# Patient Record
Sex: MALE | Race: BLACK OR AFRICAN AMERICAN | NOT HISPANIC OR LATINO | Employment: UNEMPLOYED | URBAN - METROPOLITAN AREA
[De-identification: names, ages, dates, MRNs, and addresses within clinical notes are randomized per-mention and may not be internally consistent; named-entity substitution may affect disease eponyms.]

---

## 2019-03-17 ENCOUNTER — HOSPITAL ENCOUNTER (EMERGENCY)
Facility: OTHER | Age: 39
Discharge: HOME | End: 2019-03-17
Attending: EMERGENCY MEDICINE

## 2019-03-17 VITALS
WEIGHT: 150 LBS | TEMPERATURE: 98 F | SYSTOLIC BLOOD PRESSURE: 140 MMHG | OXYGEN SATURATION: 99 % | RESPIRATION RATE: 18 BRPM | HEART RATE: 85 BPM | DIASTOLIC BLOOD PRESSURE: 82 MMHG | HEIGHT: 72 IN | BODY MASS INDEX: 20.32 KG/M2

## 2019-03-17 DIAGNOSIS — K02.9 DENTAL CARIES: ICD-10-CM

## 2019-03-17 DIAGNOSIS — H66.91 RIGHT OTITIS MEDIA, UNSPECIFIED OTITIS MEDIA TYPE: Primary | ICD-10-CM

## 2019-03-17 PROCEDURE — 99283 EMERGENCY DEPT VISIT LOW MDM: CPT

## 2019-03-17 PROCEDURE — 25000003 PHARM REV CODE 250: Performed by: EMERGENCY MEDICINE

## 2019-03-17 RX ORDER — AMOXICILLIN 250 MG/1
500 CAPSULE ORAL
Status: COMPLETED | OUTPATIENT
Start: 2019-03-17 | End: 2019-03-17

## 2019-03-17 RX ORDER — AMOXICILLIN 875 MG/1
875 TABLET, FILM COATED ORAL 2 TIMES DAILY
Qty: 14 TABLET | Refills: 0 | Status: SHIPPED | OUTPATIENT
Start: 2019-03-17

## 2019-03-17 RX ORDER — LIDOCAINE HYDROCHLORIDE 20 MG/ML
5 SOLUTION OROPHARYNGEAL
Status: COMPLETED | OUTPATIENT
Start: 2019-03-17 | End: 2019-03-17

## 2019-03-17 RX ADMIN — AMOXICILLIN 500 MG: 250 CAPSULE ORAL at 08:03

## 2019-03-17 RX ADMIN — LIDOCAINE HYDROCHLORIDE 5 ML: 20 SOLUTION ORAL; TOPICAL at 08:03

## 2019-03-17 NOTE — ED PROVIDER NOTES
"Encounter Date: 3/17/2019    SCRIBE #1 NOTE: I, Temitope Hobbs, am scribing for, and in the presence of, Dr. Beckett.       History     Chief Complaint   Patient presents with    Facial Swelling     Pt c/o right sided facial swelling, jaw pain & ear ache which started 1 week ago & progressively worsening. Pt states "It feels like its my teeth."     Time seen by provider: 7:35 AM    This is a 38 y.o. male who presents to the ED with complaint of right-sided facial swelling due to right ear and jaw pain that began approximately three days ago. Patient states it hurts more in the jaw with the pain radiating to his ear. Patient states the pain is so severe that has been preventing him from sleeping. Patient states he has been able to eat. Patient has a grill on his upper teeth which he had since he was 11 years old. Patient denies fever, chills, or trouble swallowing. Patient has no known allergies.      The history is provided by the patient.     Review of patient's allergies indicates:  No Known Allergies  History reviewed. No pertinent past medical history.  History reviewed. No pertinent surgical history.  History reviewed. No pertinent family history.  Social History     Tobacco Use    Smoking status: Current Every Day Smoker     Types: Cigarettes    Smokeless tobacco: Never Used   Substance Use Topics    Alcohol use: Yes     Frequency: Never     Comment: Socially    Drug use: Yes     Types: Marijuana     Review of Systems   Constitutional: Negative for chills and fever.   HENT: Positive for ear pain and facial swelling. Negative for trouble swallowing.    Respiratory: Negative for shortness of breath.    Cardiovascular: Negative for chest pain.   Gastrointestinal: Negative for nausea.   Genitourinary: Negative for dysuria.   Musculoskeletal: Positive for arthralgias (right jaw). Negative for back pain.   Skin: Negative for rash.   Neurological: Negative for weakness.   Hematological: Does not bruise/bleed " easily.       Physical Exam     Initial Vitals [03/17/19 0721]   BP Pulse Resp Temp SpO2   (!) 140/82 85 18 98.1 °F (36.7 °C) 99 %      MAP       --         Physical Exam    Nursing note and vitals reviewed.  Constitutional: He appears well-developed and well-nourished. He is not diaphoretic. No distress.   HENT:   Head: Normocephalic and atraumatic.   [place] in place in upper teeth. Multiple carries. No tooth tenderness to palpation or abscess noted. No facial swelling. Right TM erythematous and bulging with erythema to the canal. Uvula midline.    Eyes: Conjunctivae and EOM are normal. Pupils are equal, round, and reactive to light. No scleral icterus.   Neck: Normal range of motion. Neck supple.   Cardiovascular: Normal rate, regular rhythm and normal heart sounds. Exam reveals no gallop and no friction rub.    No murmur heard.  Pulmonary/Chest: Breath sounds normal. No stridor. No respiratory distress. He has no wheezes. He has no rhonchi. He has no rales.   Airways patent.   Abdominal: Soft. Bowel sounds are normal. He exhibits no distension. There is no tenderness. There is no rebound and no guarding.   Musculoskeletal: Normal range of motion. He exhibits no edema or tenderness.   Lymphadenopathy:     He has no cervical adenopathy.   Neurological: He is alert and oriented to person, place, and time.   Skin: Skin is warm and dry. No rash noted. No erythema. No pallor.   Psychiatric: He has a normal mood and affect. His behavior is normal. Judgment and thought content normal.         ED Course   Procedures  Labs Reviewed - No data to display       Imaging Results    None          Medical Decision Making:   Differential Diagnosis:   Otitis media, dental infection, dental caries  ED Management:  This is an emergent evaluation of a 38 year  male presenting with complaint of right ear and jaw pain. On physical exam he is nontoxic, afebrile, hypertensive, with bulging and erythema to the right tympanic  membrane.  Noted patient has poor dentition with multiple caries and gold drill in place however no tooth tenderness to palpation, no abscess is noted, and no bleeding of the gums.  Patient has been prescribed amoxicillin for otitis media and instructed to follow up with dentist and primary care.            Scribe Attestation:   Scribe #1: I performed the above scribed service and the documentation accurately describes the services I performed. I attest to the accuracy of the note.    Attending Attestation:           Physician Attestation for Scribe:  Physician Attestation Statement for Scribe #1: I, Dr. Beckett, reviewed documentation, as scribed by Temitope Hobbs in my presence, and it is both accurate and complete.                    Clinical Impression:     1. Right otitis media, unspecified otitis media type    2. Dental caries                                   Shahida Beckett MD  03/18/19 3586

## 2019-03-17 NOTE — ED TRIAGE NOTES
"Pt presents with c/o right sided facial pain and tooth pain radiating from the ear onset 1 week ago. Pt took ibuprofen this AM. Pt reports he does not have a dentist "I haven't seen one in years." Pt right ear red and inflamed on exam. Pt AAOx4, RR even and unlabored, NAD noted. Bed locked and in lowest position with call light within reach.  "

## 2019-08-12 ENCOUNTER — HOSPITAL ENCOUNTER (EMERGENCY)
Facility: HOSPITAL | Age: 39
Discharge: HOME | End: 2019-08-12
Attending: EMERGENCY MEDICINE

## 2019-08-12 VITALS
HEART RATE: 70 BPM | TEMPERATURE: 100 F | RESPIRATION RATE: 16 BRPM | OXYGEN SATURATION: 100 % | DIASTOLIC BLOOD PRESSURE: 105 MMHG | HEIGHT: 72 IN | SYSTOLIC BLOOD PRESSURE: 178 MMHG | WEIGHT: 160 LBS | BODY MASS INDEX: 21.67 KG/M2

## 2019-08-12 DIAGNOSIS — S61.235A: Primary | ICD-10-CM

## 2019-08-12 PROCEDURE — 90471 IMMUNIZATION ADMIN: CPT | Performed by: EMERGENCY MEDICINE

## 2019-08-12 PROCEDURE — 25000003 PHARM REV CODE 250: Performed by: EMERGENCY MEDICINE

## 2019-08-12 PROCEDURE — 96372 THER/PROPH/DIAG INJ SC/IM: CPT

## 2019-08-12 PROCEDURE — 99284 PR EMERGENCY DEPT VISIT,LEVEL IV: ICD-10-PCS | Mod: ,,, | Performed by: EMERGENCY MEDICINE

## 2019-08-12 PROCEDURE — 99284 EMERGENCY DEPT VISIT MOD MDM: CPT | Mod: 25

## 2019-08-12 PROCEDURE — 64450 NJX AA&/STRD OTHER PN/BRANCH: CPT

## 2019-08-12 PROCEDURE — 90715 TDAP VACCINE 7 YRS/> IM: CPT | Performed by: EMERGENCY MEDICINE

## 2019-08-12 PROCEDURE — 63600175 PHARM REV CODE 636 W HCPCS: Performed by: EMERGENCY MEDICINE

## 2019-08-12 PROCEDURE — 99284 EMERGENCY DEPT VISIT MOD MDM: CPT | Mod: ,,, | Performed by: EMERGENCY MEDICINE

## 2019-08-12 RX ORDER — AMPICILLIN AND SULBACTAM 1; .5 G/1; G/1
1.5 INJECTION, POWDER, FOR SOLUTION INTRAMUSCULAR; INTRAVENOUS
Status: COMPLETED | OUTPATIENT
Start: 2019-08-12 | End: 2019-08-12

## 2019-08-12 RX ORDER — BUPIVACAINE HYDROCHLORIDE 2.5 MG/ML
5 INJECTION, SOLUTION EPIDURAL; INFILTRATION; INTRACAUDAL
Status: COMPLETED | OUTPATIENT
Start: 2019-08-12 | End: 2019-08-12

## 2019-08-12 RX ORDER — AMOXICILLIN AND CLAVULANATE POTASSIUM 875; 125 MG/1; MG/1
1 TABLET, FILM COATED ORAL EVERY 12 HOURS
Qty: 14 TABLET | Refills: 0 | Status: SHIPPED | OUTPATIENT
Start: 2019-08-12 | End: 2019-08-19

## 2019-08-12 RX ORDER — AMPICILLIN AND SULBACTAM 2; 1 G/1; G/1
3 INJECTION, POWDER, FOR SOLUTION INTRAMUSCULAR; INTRAVENOUS
Status: DISCONTINUED | OUTPATIENT
Start: 2019-08-12 | End: 2019-08-12

## 2019-08-12 RX ORDER — HYDROCODONE BITARTRATE AND ACETAMINOPHEN 5; 325 MG/1; MG/1
1 TABLET ORAL EVERY 6 HOURS PRN
Qty: 13 TABLET | Refills: 0 | Status: SHIPPED | OUTPATIENT
Start: 2019-08-12

## 2019-08-12 RX ORDER — HYDROCODONE BITARTRATE AND ACETAMINOPHEN 5; 325 MG/1; MG/1
2 TABLET ORAL
Status: COMPLETED | OUTPATIENT
Start: 2019-08-12 | End: 2019-08-12

## 2019-08-12 RX ADMIN — BUPIVACAINE HYDROCHLORIDE 12.5 MG: 2.5 INJECTION, SOLUTION EPIDURAL; INFILTRATION; INTRACAUDAL at 09:08

## 2019-08-12 RX ADMIN — CLOSTRIDIUM TETANI TOXOID ANTIGEN (FORMALDEHYDE INACTIVATED), CORYNEBACTERIUM DIPHTHERIAE TOXOID ANTIGEN (FORMALDEHYDE INACTIVATED), BORDETELLA PERTUSSIS TOXOID ANTIGEN (GLUTARALDEHYDE INACTIVATED), BORDETELLA PERTUSSIS FILAMENTOUS HEMAGGLUTININ ANTIGEN (FORMALDEHYDE INACTIVATED), BORDETELLA PERTUSSIS PERTACTIN ANTIGEN, AND BORDETELLA PERTUSSIS FIMBRIAE 2/3 ANTIGEN 0.5 ML: 5; 2; 2.5; 5; 3; 5 INJECTION, SUSPENSION INTRAMUSCULAR at 08:08

## 2019-08-12 RX ADMIN — AMPICILLIN AND SULBACTAM 1.5 G: 1; .5 INJECTION, POWDER, FOR SOLUTION INTRAMUSCULAR; INTRAVENOUS at 08:08

## 2019-08-12 RX ADMIN — HYDROCODONE BITARTRATE AND ACETAMINOPHEN 2 TABLET: 5; 325 TABLET ORAL at 06:08

## 2019-08-12 NOTE — ED TRIAGE NOTES
"Pt states he injured the left ring finger while playing football yesterday. States the person who hit hims "teeth went into my finger". Bandage noted to finger.   "

## 2019-08-12 NOTE — LETTER
Fax Transmission                                                                                                                                                       Date: August 12, 2019       To:   From:    Fax:   Fax:    Phone:   Phone:      Special Instructions:     ***For questions or issues, please contact department listed on attached report.***                            If there are any problems with this transmission, please call immediately. Thank you.    Confidentiality notice: The accompanying facsimile is intended solely for the use of the recipient designated above. Document(s) transmitted herewith may contain information that is confidential and privileged. Delivery, distribution or dissemination of this communication other than to the intended recipient is strictly prohibited. If you have received this facsimile in error, please notify [facility]'s Corporate Integrity Department immediately by telephone at [phone].

## 2019-08-12 NOTE — ED PROVIDER NOTES
Encounter Date: 8/12/2019    SCRIBE #1 NOTE: I, Ivonne Mcdonald, am scribing for, and in the presence of,  Lm Jimenez III, MD. I have scribed the entire note.       History     Chief Complaint   Patient presents with    Finger Injury     punched someone in mouth yest, wound to L ring finger     Time patient was seen by the provider: 6:14 PM      The patient is a 38 y.o. male with no PMHx,  who presents to the ED with a complaint of left hand pain and left ring finger wound s/p playing touch football yesterday. He reports he was playing football when his hand hit someone's mouth. Patient reports being unable to clench his hand secondary to the pain. Unknown last tetanus shot. Patient denies any other associated symptoms.       The history is provided by the patient.     Review of patient's allergies indicates:  No Known Allergies  History reviewed. No pertinent past medical history.  History reviewed. No pertinent surgical history.  History reviewed. No pertinent family history.  Social History     Tobacco Use    Smoking status: Current Every Day Smoker     Types: Cigarettes    Smokeless tobacco: Never Used   Substance Use Topics    Alcohol use: Yes     Frequency: Never     Comment: Socially    Drug use: Yes     Frequency: 7.0 times per week     Types: Marijuana     Review of Systems   Constitutional: Negative for fever.   HENT: Negative for sore throat.    Respiratory: Negative for shortness of breath.    Cardiovascular: Negative for chest pain.   Gastrointestinal: Negative for nausea.   Genitourinary: Negative for dysuria.   Musculoskeletal: Negative for back pain.        Left hand pain   Skin: Positive for wound (Left fourth digit). Negative for rash.   Neurological: Negative for weakness.   Hematological: Does not bruise/bleed easily.       Physical Exam     Initial Vitals [08/12/19 1558]   BP Pulse Resp Temp SpO2   (!) 178/99 89 18 99.2 °F (37.3 °C) 99 %      MAP       --         Physical  Exam    Nursing note and vitals reviewed.  Constitutional: He appears well-developed and well-nourished. He is not diaphoretic. He appears distressed.   Distressed secondary to pain.    HENT:   Head: Normocephalic and atraumatic.   Nose: Nose normal.   Eyes: Conjunctivae are normal. Right eye exhibits no discharge. Left eye exhibits no discharge.   Neck: Normal range of motion. Neck supple.   Cardiovascular: Normal rate, regular rhythm and normal heart sounds. Exam reveals no gallop and no friction rub.    No murmur heard.  Pulmonary/Chest: Breath sounds normal. No respiratory distress. He has no wheezes. He has no rhonchi. He has no rales.   Abdominal: Soft. He exhibits no distension. There is no tenderness. There is no rebound and no guarding.   Genitourinary: Penis normal.   Musculoskeletal: Normal range of motion. He exhibits no edema or tenderness.   Left ring finger swollen tender with severe restriction of range of motion at the PIP of the left ring finger where a puncture wound was noted. Capillary refill less than 2 seconds. Light sensation intact.    Neurological: He is alert and oriented to person, place, and time. He has normal strength. GCS score is 15. GCS eye subscore is 4. GCS verbal subscore is 5. GCS motor subscore is 6.   Skin: Skin is warm and dry. Capillary refill takes less than 2 seconds. No rash noted.   Refer back to MS   Psychiatric: He has a normal mood and affect. His behavior is normal. Judgment normal.         ED Course   Procedures  Labs Reviewed - No data to display       Imaging Results          X-Ray Finger 2 or More Views Left (Final result)  Result time 08/12/19 19:07:34    Final result by Jovanny Delacruz MD (08/12/19 19:07:34)                 Impression:      1. No convincing acute displaced fracture or dislocation of the visualized digits noting edema about the 3rd through 5th digits.      Electronically signed by: Jovanny Delacruz MD  Date:    08/12/2019  Time:    19:07              Narrative:    EXAMINATION:  XR FINGER 2 OR MORE VIEWS LEFT    CLINICAL HISTORY:  pain;    COMPARISON:  None    FINDINGS:  Three views, left 3rd 4th and 5th digits.    No acute displaced fracture or dislocation of the visualized digits.  No radiopaque foreign body.  There is edema about the digits.                                 Medical Decision Making:   History:   Old Medical Records: I decided to obtain old medical records.  Old Records Summarized: records from clinic visits.       <> Summary of Records: Records summarized in HPI   Initial Assessment:   38 y.o. male presents with acute severe pain to his left ring finger. Patient was playing touch football yesterday and accidentally struck the teeth of another player with the dorsal aspect of his left hand. He suffered a puncture wound to the PIP of the left ring finger. Today the pain is so severe that he can't bend his left index finger. It is swollen and cellulitic with some extension towards the MCP. However, there is no lumbar pull or deep space pain to the left hand. Patient's examination at this time is difficult to determine any tendon injury or fracture. Xray is pending. Will consult orthopedics and consider doing a digit block. Consider fracture and or tendon injury with possible septic joint and less likely deep space infection.   Clinical Tests:   Radiological Study: Ordered and Reviewed  Other:   I have discussed this case with another health care provider.       <> Summary of the Discussion: consult to Orthopedic Surgery             Scribe Attestation:   Scribe #1: I performed the above scribed service and the documentation accurately describes the services I performed. I attest to the accuracy of the note.               Clinical Impression:       ICD-10-CM ICD-9-CM   1. Puncture wound of left ring finger with complication S61.235A 883.1         Disposition:   Disposition: Discharged  Condition: Stable  Patient was evaluated by Ortho, and  after performing a digit block patient had full range of motion of his hand.  There does not appear to be any tendon injury. Ortho also does not believe that the puncture wound penetrated the joint or there is an infected joint.  It was cleaned out and he was given IM antibiotics here and will be discharged on p.o. Augmentin with follow-up with Ortho at [facility].                        Lm Jimenez III, MD  08/12/19 8089

## 2019-08-13 NOTE — CONSULTS
Orthopaedic Surgery  Consult Note    Leandro Peters  08/12/2019    CC: left ring finger laceration    HPI: Leandro Peters is a RHD 38 y.o. male with no significant PMHx who presents with a laceration over the dorsal aspect of the left ring finger at the level of the PIP joint. Patients states that he was playing backyard football with his nephew when his hand struck the face of his nephew.  He believes that the laceration was due to a cut by his nephew's tooth.  This injury occurred yesterday.  He has tried to keep the laceration clean, but has experienced increased swelling in the finger diffusely.  Denies head injury or LOC. Denies prior injuries or surgeries to the left upper extremity. On no blood thinners at home. Denies other MSK pains or paresthesias.  He has not had any antibiotics prior to arrival to ED.  He was given IV Unasyn x1 and T dap upon arrival to the ED.      History reviewed. No pertinent past medical history.  History reviewed. No pertinent surgical history.  History reviewed. No pertinent family history.  Social History     Socioeconomic History    Marital status: Single     Spouse name: Not on file    Number of children: Not on file    Years of education: Not on file    Highest education level: Not on file   Occupational History    Not on file   Social Needs    Financial resource strain: Not on file    Food insecurity:     Worry: Not on file     Inability: Not on file    Transportation needs:     Medical: Not on file     Non-medical: Not on file   Tobacco Use    Smoking status: Current Every Day Smoker     Types: Cigarettes    Smokeless tobacco: Never Used   Substance and Sexual Activity    Alcohol use: Yes     Frequency: Never     Comment: Socially    Drug use: Yes     Frequency: 7.0 times per week     Types: Marijuana    Sexual activity: Not on file   Lifestyle    Physical activity:     Days per week: Not on file     Minutes per session: Not on file    Stress: Not on file    Relationships    Social connections:     Talks on phone: Not on file     Gets together: Not on file     Attends Sabianism service: Not on file     Active member of club or organization: Not on file     Attends meetings of clubs or organizations: Not on file     Relationship status: Not on file   Other Topics Concern    Not on file   Social History Narrative    Not on file     No current facility-administered medications on file prior to encounter.      Current Outpatient Medications on File Prior to Encounter   Medication Sig    amoxicillin (AMOXIL) 875 MG tablet Take 1 tablet (875 mg total) by mouth 2 (two) times daily.         Review of Systems:  Constitutional: negative for fevers  Eyes: negative visual changes  ENT: negative for hearing loss  Respiratory: negative for dyspnea  Cardiovascular: negative for chest pain  Gastrointestinal: negative for abdominal pain  Genitourinary: negative for dysuria  Neurological: negative for headaches  Behavioral/Psych: negative for hallucinations  Endocrine: negative for temperature intolerance      Physical Exam:    Temp:  [99.2 °F (37.3 °C)-99.5 °F (37.5 °C)] 99.5 °F (37.5 °C)  Pulse:  [70-89] 70  Resp:  [16-18] 16  SpO2:  [99 %-100 %] 100 %  BP: (178-182)/() 178/105    Vitals: Afebrile.  Vital signs stable.  General: No acute distress.  HEENT: Normocephalic. Atraumatic. Sclera anicteric. No tracheal deviation.  Cardio: Regular rate.  Chest: No increased work of breathing.  Abdominal: Nondistended.  Extremities: No cyanosis.  No clubbing.    Skin: No generalized rash.  Neuro: Awake. Alert. Oriented to person, place, time, and situation.  Psych: Normal appearance. Cooperative.  Appropriate mood.  Appropriate affect.      MSK:  Left hand:  1 cm laceration over the dorsal aspect of the left ring finger at the level of the PIP joint  Diffuse swelling of the entire ring finger  SILT throughout  All extensor and flexor tendons intact  ROM at the PIP and DIP joints of  the ring finger limited due to swelling and pain  <2 sec cap refill    Diagnostic Results:  X-ray of the left hand show no fractures, dislocations, or bony defects    Procedure Note: L Ring Finger Laceration Repair  After time out was performed and patient ID, side, and site were verified, the left Ring finger was sterilly prepped in the standard fashion.  10 mL's in a 25-gauge needle was used for a digital block of the left ring finger. After adequate analgesia was obtained, the wound was examined. Examination showed no tendon or nerve injuries.  The laceration did not probe down to joint.  There were no signs of fracture. The laceration was then thoroughly irrigated with 2L of normal saline and betadine.  The wound was not primarily closed to allow for drainage. The wound was sterilely dressed with Xeroform, 4 x 4, and cast padding, then placed into an ulnar gutter splint in typical fashion.  The patient tolerated the procedure well with no complications.  Blood loss was minimal.      Assessment/Plan:  Leandro Peters is a 38 y.o. male with left ring finger laceration  - Bedside I&D performed and splinted in ED   - NWB to left upper extremity, pt encouraged to keep extremity iced and elevated at all times  - T dap and IV Unasyn x1 given in ED  - sent home with 7 days of PO Augmentin    This patient will need close orthopedic follow-up for this laceration.  The patient was presented several options for follow-up including care at [facility], but the patient did not desire further care here due to financial concerns.  Follow up was then offered at [facility], [facility], and [facility].  Patient has elected to seek follow up care at [facility].  Contact number was taken and the patient will be contacted for a follow up appointment.  Patient is being discharged with paperwork including all diagnostic workup while here at [facility] and instructed to bring to to their follow up appointment. Referral form was faxed to orthopedic clinic  at [facility] in the ED.  Patient was in full understanding and agreement with this plan.         Boo Batista MD  Orthopaedic Surgery Resident  08/12/2019

## 2020-11-19 ENCOUNTER — HOSPITAL ENCOUNTER (EMERGENCY)
Facility: HOSPITAL | Age: 40
Discharge: HOME | End: 2020-11-19
Attending: EMERGENCY MEDICINE
Payer: MEDICAID

## 2020-11-19 VITALS
SYSTOLIC BLOOD PRESSURE: 131 MMHG | DIASTOLIC BLOOD PRESSURE: 84 MMHG | RESPIRATION RATE: 18 BRPM | OXYGEN SATURATION: 96 % | HEIGHT: 72 IN | BODY MASS INDEX: 24.38 KG/M2 | TEMPERATURE: 98 F | WEIGHT: 180 LBS | HEART RATE: 81 BPM

## 2020-11-19 DIAGNOSIS — R55 NEAR SYNCOPE: ICD-10-CM

## 2020-11-19 DIAGNOSIS — S61.011A LACERATION OF RIGHT THUMB WITHOUT FOREIGN BODY WITHOUT DAMAGE TO NAIL, INITIAL ENCOUNTER: Primary | ICD-10-CM

## 2020-11-19 LAB — GLUCOSE SERPL-MCNC: 102 MG/DL (ref 70–110)

## 2020-11-19 PROCEDURE — 93010 EKG 12-LEAD: ICD-10-PCS | Mod: ,,, | Performed by: INTERNAL MEDICINE

## 2020-11-19 PROCEDURE — 93010 ELECTROCARDIOGRAM REPORT: CPT | Mod: ,,, | Performed by: INTERNAL MEDICINE

## 2020-11-19 PROCEDURE — 82962 GLUCOSE BLOOD TEST: CPT

## 2020-11-19 PROCEDURE — 12002 RPR S/N/AX/GEN/TRNK2.6-7.5CM: CPT | Mod: F5

## 2020-11-19 PROCEDURE — 93005 ELECTROCARDIOGRAM TRACING: CPT

## 2020-11-19 PROCEDURE — 12002 PR RESUP NPTERF WND BODY 2.6-7.5 CM: ICD-10-PCS | Mod: ,,, | Performed by: EMERGENCY MEDICINE

## 2020-11-19 PROCEDURE — 25000003 PHARM REV CODE 250: Performed by: INTERNAL MEDICINE

## 2020-11-19 PROCEDURE — 99285 PR EMERGENCY DEPT VISIT,LEVEL V: ICD-10-PCS | Mod: 25,,, | Performed by: EMERGENCY MEDICINE

## 2020-11-19 PROCEDURE — 12002 RPR S/N/AX/GEN/TRNK2.6-7.5CM: CPT | Mod: ,,, | Performed by: EMERGENCY MEDICINE

## 2020-11-19 PROCEDURE — 63600175 PHARM REV CODE 636 W HCPCS: Mod: SL | Performed by: INTERNAL MEDICINE

## 2020-11-19 PROCEDURE — 99284 EMERGENCY DEPT VISIT MOD MDM: CPT | Mod: 25

## 2020-11-19 PROCEDURE — 99285 EMERGENCY DEPT VISIT HI MDM: CPT | Mod: 25,,, | Performed by: EMERGENCY MEDICINE

## 2020-11-19 PROCEDURE — 90715 TDAP VACCINE 7 YRS/> IM: CPT | Mod: SL | Performed by: INTERNAL MEDICINE

## 2020-11-19 PROCEDURE — 90471 IMMUNIZATION ADMIN: CPT | Mod: VFC | Performed by: INTERNAL MEDICINE

## 2020-11-19 RX ORDER — LIDOCAINE HYDROCHLORIDE 10 MG/ML
10 INJECTION INFILTRATION; PERINEURAL
Status: COMPLETED | OUTPATIENT
Start: 2020-11-19 | End: 2020-11-19

## 2020-11-19 RX ADMIN — BACITRACIN, NEOMYCIN, POLYMYXIN B 1 EACH: 400; 3.5; 5 OINTMENT TOPICAL at 10:11

## 2020-11-19 RX ADMIN — LIDOCAINE HYDROCHLORIDE 10 ML: 10 INJECTION, SOLUTION INFILTRATION; PERINEURAL at 09:11

## 2020-11-19 RX ADMIN — CLOSTRIDIUM TETANI TOXOID ANTIGEN (FORMALDEHYDE INACTIVATED), CORYNEBACTERIUM DIPHTHERIAE TOXOID ANTIGEN (FORMALDEHYDE INACTIVATED), BORDETELLA PERTUSSIS TOXOID ANTIGEN (GLUTARALDEHYDE INACTIVATED), BORDETELLA PERTUSSIS FILAMENTOUS HEMAGGLUTININ ANTIGEN (FORMALDEHYDE INACTIVATED), BORDETELLA PERTUSSIS PERTACTIN ANTIGEN, AND BORDETELLA PERTUSSIS FIMBRIAE 2/3 ANTIGEN 0.5 ML: 5; 2; 2.5; 5; 3; 5 INJECTION, SUSPENSION INTRAMUSCULAR at 09:11

## 2020-11-20 LAB — POCT GLUCOSE: 102 MG/DL (ref 70–110)

## 2020-11-20 NOTE — ED PROVIDER NOTES
Encounter Date: 11/19/2020       History     Chief Complaint   Patient presents with    Extremity Laceration     Cut to base of right thumb from knife when washing dishes. Rates 10/10. Bleeding noted and dressing placed in triage.      Leandro Peters is a 38 yo male with no significant past medical history who presents the ED with chief complaint of finger laceration.  Patient states he was in his normal status of health today washing dishes.  He cut his thumb on a knife when washing dishes ten mins prior to arrival.  Otherwise he has no complaints, specifically denying headache, chest pain, shortness of breath, fever, recent illnesses.  Patient does not recall his last tetanus shot.        Review of patient's allergies indicates:  No Known Allergies  History reviewed. No pertinent past medical history.  History reviewed. No pertinent surgical history.  No family history on file.  Social History     Tobacco Use    Smoking status: Current Every Day Smoker     Types: Cigarettes    Smokeless tobacco: Never Used   Substance Use Topics    Alcohol use: Yes     Frequency: Never     Comment: Socially    Drug use: Yes     Frequency: 7.0 times per week     Types: Marijuana     Review of Systems   Constitutional: Negative for activity change, appetite change, chills, fever and unexpected weight change.   HENT: Negative for sore throat, trouble swallowing and voice change.    Eyes: Negative for visual disturbance.   Respiratory: Negative for cough, chest tightness, shortness of breath and wheezing.    Cardiovascular: Negative for chest pain, palpitations and leg swelling.   Gastrointestinal: Negative for abdominal distention, abdominal pain, blood in stool, constipation, diarrhea, nausea, rectal pain and vomiting.   Genitourinary: Negative for difficulty urinating, dysuria and hematuria.   Musculoskeletal: Negative for back pain, gait problem, neck pain and neck stiffness.        Right thumb laceration   Skin: Negative for  color change and rash.   Neurological: Negative for dizziness, seizures, syncope, weakness, light-headedness, numbness and headaches.       Physical Exam     Initial Vitals   BP Pulse Resp Temp SpO2   11/19/20 2058 11/19/20 2058 11/19/20 2058 11/19/20 2102 11/19/20 2058   (!) 176/105 (!) 125 18 98 °F (36.7 °C) 96 %      MAP       --                Physical Exam    Nursing note and vitals reviewed.  Constitutional: He appears well-developed and well-nourished.   HENT:   Head: Normocephalic and atraumatic.   Mouth/Throat: Oropharynx is clear and moist.   Eyes: Conjunctivae and EOM are normal. Pupils are equal, round, and reactive to light.   Neck: Trachea normal and normal range of motion. Neck supple. No thyroid mass present. No tracheal deviation present. No JVD present.   Cardiovascular: Normal rate, regular rhythm, normal heart sounds, intact distal pulses and normal pulses. Exam reveals no gallop and no friction rub.    No murmur heard.  Pulmonary/Chest: Breath sounds normal. He has no rales. He exhibits no tenderness.   Abdominal: Soft. Normal appearance and bowel sounds are normal. There is no abdominal tenderness. There is no guarding.   Musculoskeletal: Normal range of motion. No tenderness or edema.      Comments: 3 cm laceration on the distal aspect of the great some on the right hand.  Normal flexion and extension at the D IP and PIP.  Sensation intact.   Neurological: He is alert and oriented to person, place, and time. He has normal strength. No cranial nerve deficit or sensory deficit. GCS eye subscore is 4. GCS verbal subscore is 5. GCS motor subscore is 6.   Skin: Skin is warm, dry and intact. Capillary refill takes less than 2 seconds. No abscess noted. No erythema.   Psychiatric: He has a normal mood and affect. His speech is normal and behavior is normal.         ED Course   Lac Repair    Date/Time: 11/19/2020 10:30 PM  Performed by: Sandra Muniz MD  Authorized by: Nimco Anne MD      Consent:     Consent obtained:  Verbal    Consent given by:  Patient    Risks discussed:  Pain, infection and poor cosmetic result    Alternatives discussed:  No treatment and observation  Anesthesia (see MAR for exact dosages):     Anesthesia method:  Local infiltration    Local anesthetic:  Lidocaine 1% w/o epi  Laceration details:     Location:  Finger    Finger location:  R thumb    Length (cm):  3  Repair type:     Repair type:  Simple  Pre-procedure details:     Preparation:  Patient was prepped and draped in usual sterile fashion  Exploration:     Hemostasis achieved with:  Direct pressure    Wound exploration: wound explored through full range of motion and entire depth of wound probed and visualized      Wound extent: no nerve damage noted, no tendon damage noted and no vascular damage noted    Treatment:     Amount of cleaning:  Extensive    Irrigation solution:  Sterile saline    Irrigation method:  Pressure wash    Visualized foreign bodies/material removed: no    Skin repair:     Repair method:  Sutures    Suture size:  4-0    Suture material:  Prolene    Number of sutures:  6  Approximation:     Approximation:  Close  Post-procedure details:     Dressing:  Antibiotic ointment and non-adherent dressing    Patient tolerance of procedure:  Tolerated well, no immediate complications      Labs Reviewed   POCT GLUCOSE     EKG Readings: (Independently Interpreted)   EKG on November 19, 2020 at 9:38 p.m. shows normal sinus rhythm with ventricular rate of 76 beats per minute.  No ST segment elevations or depressions.  No STEMI.  Criteria for LVH.  No previous EKGs for comparison.     ECG Results          EKG 12-lead (In process)  Result time 11/20/20 07:50:42    In process by Interface, Lab In [facility] (11/20/20 07:50:42)                 Narrative:    Test Reason : R55,    Vent. Rate : 073 BPM     Atrial Rate : 073 BPM     P-R Int : 136 ms          QRS Dur : 086 ms      QT Int : 418 ms       P-R-T Axes : 075  075 036 degrees     QTc Int : 460 ms    Normal sinus rhythm  LVH with repolarization abnormality  Abnormal ECG  No previous ECGs available    Referred By: [email or web address]   SELF           Confirmed By:                             Imaging Results          X-Ray Finger 2 or More Views Right (Final result)  Result time 11/19/20 22:03:23    Final result by Oskar Hamlin MD (11/19/20 22:03:23)                 Impression:      No acute radiographic abnormality.      Electronically signed by: Oskar Hamlin  Date:    11/19/2020  Time:    22:03             Narrative:    EXAMINATION:  XR FINGER 2 OR MORE VIEWS RIGHT    CLINICAL HISTORY:  thumb;    TECHNIQUE:  Three views of the right thumb.    COMPARISON:  None    FINDINGS:  Alignment is satisfactory.  No acute fracture, subluxation or dislocation.  No mass or foreign body.                                 Medical Decision Making:   Initial Assessment:   Urgent evaluation of a 39-year-old male with laceration to the finger.  Hemodynamically stable, afebrile.  Differential Diagnosis:   -laceration  -abrasion  -fracture  -retained foreign body  Independently Interpreted Test(s):   I have ordered and independently interpreted X-rays - see summary below.  ED Management:  Patient was noted to have a vasovagal episode on the way to the room.  EKG showed no arrhythmias or signs of ischemia.  X-ray was obtained of thumb for foreign body.  No foreign bodies were visualized.  X-ray was noted to be normal.  Physical exam shows good extension and flexion at the DIP and PIP and good grasp.  Laceration was repaired with 6 sutures (please refer to procedure note).  Patient was instructed that he would need to have his sutures removed in the next 7-10 days.  Wound was dressed with bacitracin.  I recommended Tylenol and Motrin for the pain.  He was instructed on keeping the wound clean and dry.  All questions were answered.  He focalized verbal understanding and agreement with the plan.  He was  "deemed stable for discharge.              Attending Attestation:   Physician Attestation Statement for Resident:  As the supervising MD   Physician Attestation Statement: I have personally seen and examined this patient.   I agree with the above history. -:   As the supervising MD I agree with the above PE.    As the supervising MD I agree with the above treatment, course, plan, and disposition.   -: Pt had near-syncopal event upon presentation to the ED, he reports he "felt hot" just prior and was likely vasovagal syncope, however will obtain EKG in addition to XR to evaluate for FB or bony involvement.     XR neg, pt well-appearing, tolerated repair of finger laceration well.   I was personally present during the critical portions of the procedure(s) performed by the resident and was immediately available in the ED to provide services and assistance as needed during the entire procedure.  I have reviewed and agree with the residents interpretation of the following: x-rays.                              Clinical Impression:     ICD-10-CM ICD-9-CM   1. Laceration of right thumb without foreign body without damage to nail, initial encounter  S61.011A 883.0   2. Near syncope  R55 780.2                      Disposition:   Disposition: Discharged     ED Disposition Condition    Discharge Stable        ED Prescriptions     None        Follow-up Information     Follow up With Specialties Details Why Contact Info    [facility] In 10 days For suture removal [address] [place] [id number]  [phone]                                       Sandra Muniz MD  Resident  11/19/20 [id number]       Nimco Anne MD  11/20/20 2125    "

## 2020-11-20 NOTE — ED NOTES
Pt laceration dressed. Pt educated on how to take care of stitches and when to get them taken out.

## 2020-11-20 NOTE — ED TRIAGE NOTES
Patient identifiers for [place] 39 y.o. male checked and correct.  Chief Complaint   Patient presents with    Extremity Laceration     Cut to base of right thumb from knife when washing dishes. Rates 10/10. Bleeding noted and dressing placed in triage.      History reviewed. No pertinent past medical history.  Allergies reported: Review of patient's allergies indicates:  No Known Allergies     LOC: The patient is awake, alert, and oriented to place, time, situation. Affect is appropriate.  Speech is appropriate and clear.     APPEARANCE: Patient resting comfortably in no acute distress.  Patient is clean and well groomed.    SKIN: The skin is warm and dry; color consistent with ethnicity.  Patient has normal skin turgor and moist mucus membranes.  Skin intact; no breakdown or bruising noted. Pt diaphoretic. Right thumb laceration, bleeding controlled    MUSCULOSKELETAL: Patient moving upper and lower extremities without difficulty.  Denies weakness. Warren like he was going to pass out    RESPIRATORY: Airway is open and patent. Respirations spontaneous, even, easy, and non-labored.  Patient has a normal effort and rate.  No accessory muscle use noted. Denies cough.     CARDIAC:  Normal rhythm and rate noted.  No peripheral edema noted. No complaints of chest pain.      ABDOMEN: Soft and non tender to palpation.  No distention noted.     NEUROLOGIC: Eyes open spontaneously.  Behavior appropriate to situation.  Follows commands; facial expression symmetrical.  Purposeful motor response noted; normal sensation in all extremities.

## 2020-11-20 NOTE — DISCHARGE INSTRUCTIONS
You were seen in the emergency department for a cut on your thumb on your right hand.  X-ray showed no foreign bodies.  Your cut was closed with sutures.  You will need to follow-up with your primary care doctor or return to the emergency department in 7-10 days to have your sutures removed.  Your wound was dressed with bacitracin.  Please keep your wound clean and dry meaning do not submerge your hand in water. You can use Motrin and Tylenol for the pain.